# Patient Record
Sex: FEMALE | Race: OTHER | NOT HISPANIC OR LATINO | URBAN - METROPOLITAN AREA
[De-identification: names, ages, dates, MRNs, and addresses within clinical notes are randomized per-mention and may not be internally consistent; named-entity substitution may affect disease eponyms.]

---

## 2022-07-01 ENCOUNTER — EMERGENCY (EMERGENCY)
Facility: HOSPITAL | Age: 27
LOS: 1 days | Discharge: HOME | End: 2022-07-01
Attending: EMERGENCY MEDICINE | Admitting: EMERGENCY MEDICINE

## 2022-07-01 VITALS
TEMPERATURE: 98 F | RESPIRATION RATE: 18 BRPM | OXYGEN SATURATION: 99 % | HEART RATE: 97 BPM | SYSTOLIC BLOOD PRESSURE: 124 MMHG | DIASTOLIC BLOOD PRESSURE: 83 MMHG

## 2022-07-01 DIAGNOSIS — R41.82 ALTERED MENTAL STATUS, UNSPECIFIED: ICD-10-CM

## 2022-07-01 DIAGNOSIS — F12.122 CANNABIS ABUSE WITH INTOXICATION WITH PERCEPTUAL DISTURBANCE: ICD-10-CM

## 2022-07-01 DIAGNOSIS — Z20.822 CONTACT WITH AND (SUSPECTED) EXPOSURE TO COVID-19: ICD-10-CM

## 2022-07-01 PROCEDURE — 99053 MED SERV 10PM-8AM 24 HR FAC: CPT

## 2022-07-01 PROCEDURE — 99285 EMERGENCY DEPT VISIT HI MDM: CPT | Mod: 25

## 2022-07-01 NOTE — ED ADULT TRIAGE NOTE - CHIEF COMPLAINT QUOTE
Pt BIBA, EMS states pt was found wondering in store and appeared alerted. EMS states pt refused to say anything beyond "I don't want to go to the hospital". Marijuana joint found on pt.

## 2022-07-01 NOTE — ED ADULT TRIAGE NOTE - NSTRIAGECARE_GEN_A_ER
"Subjective     Anisha Ahn is a 62 y.o. female who presents with Hand Swelling ((R) hand, x8 days, pt states she scratched herself and now has a pulse in injury )        HPI     Patient presents today with a possible infected wound from a \"skin scratch\" 8 days ago. She reports that she was cleaning her house when she noted her gloves to be ripped and had a scratch on her right hand.  Three to four days prior, she noticed swelling and redness that was worse on her right hand, now also involving her forearm. She also endorses pain on her right armpit. Patient is not up to date on her tetanus vaccine.  Patient denies any fever, chills, nausea, vomiting.    Patient's current problem list, medications, and past medical/surgical history were reviewed in Epic.    PMH:  has no past medical history on file.  MEDS:   Current Outpatient Medications:   •  phenazopyridine (PYRIDIUM) 200 MG Tab, Take 1 Tab by mouth 3 times a day as needed for Mild Pain or Moderate Pain. (Patient not taking: Reported on 4/8/2022), Disp: 15 Tab, Rfl: 0  •  Pseudoephedrine-DM-GG (ROBITUSSIN COLD & COUGH PO), Take  by mouth. (Patient not taking: Reported on 4/8/2022), Disp: , Rfl:   ALLERGIES: No Known Allergies  SURGHX: No past surgical history on file.  SOCHX:  reports that she has never smoked. She has never used smokeless tobacco. She reports that she does not drink alcohol and does not use drugs.  FH: Reviewed with patient, not pertinent to this visit.       Review of Systems   Constitutional: Negative for chills, fever and malaise/fatigue.   HENT: Negative.    Eyes: Negative.    Respiratory: Negative.    Cardiovascular: Negative.    Gastrointestinal: Negative.    Genitourinary: Negative.    Musculoskeletal: Negative.    Skin:        Infected wound   Neurological: Negative.    Psychiatric/Behavioral: Negative.               Objective     /72   Pulse 88   Temp 36.6 °C (97.9 °F) (Temporal)   Resp 16   Ht 1.549 m (5' 1\")   Wt 51.3 " kg (113 lb)   SpO2 98%   BMI 21.35 kg/m²      Physical Exam  Constitutional:       Appearance: Normal appearance.   HENT:      Head: Normocephalic.      Nose: Nose normal.   Eyes:      Extraocular Movements: Extraocular movements intact.   Cardiovascular:      Rate and Rhythm: Normal rate and regular rhythm.      Pulses: Normal pulses.      Heart sounds: Normal heart sounds.   Pulmonary:      Effort: Pulmonary effort is normal.      Breath sounds: Normal breath sounds.   Musculoskeletal:         General: Normal range of motion.      Cervical back: Normal range of motion.   Skin:     General: Skin is warm and dry.      Findings: Wound present.      Comments: Puncture wound right hand, at the base of middle finger dorsal area.This is erythematous and swollen.  There is also erythema on the anterior portion of the right forearm.  Lymph node on the right armpit is also palpable, movable, tender to palpation.   Neurological:      General: No focal deficit present.      Mental Status: She is alert.   Psychiatric:         Mood and Affect: Mood normal.         Behavior: Behavior normal.         Judgment: Judgment normal.            Assessment & Plan        1. Infected wound    - TD Preservative Free =>6yo IM  - clindamycin (CLEOCIN) 300 MG Cap; Take 1 Capsule by mouth 3 times a day for 5 days.  Dispense: 15 Capsule; Refill: 0    2. Lymphadenopathy, axillary    - TD Preservative Free =>6yo IM  - clindamycin (CLEOCIN) 300 MG Cap; Take 1 Capsule by mouth 3 times a day for 5 days.  Dispense: 15 Capsule; Refill: 0    Patient's presentation is consistent with infected puncture treatment and right now finger.  There is also axillary lymphadenopathy on the right.  Discussed treatment plan with patient, she is agreeable and verbalized understanding.  She is given tetanus in the clinic.  She is prescribed clindamycin 3 times daily for 5 days.  Educated patient on signs and symptoms watch out for, when to return to clinic or go to  the ER.    Differential diagnoses, supportive care, and indications for immediate follow-up discussed with patient.   Pathogenesis of diagnosis discussed including typical length and natural progression.     Instructed to return to clinic or nearest emergency department for any change in condition, further concerns, or worsening of symptoms.    Electronically Signed by JEANETTE Gonzalez                 Face Mask

## 2022-07-02 VITALS
DIASTOLIC BLOOD PRESSURE: 80 MMHG | RESPIRATION RATE: 20 BRPM | OXYGEN SATURATION: 98 % | HEART RATE: 76 BPM | TEMPERATURE: 98 F | SYSTOLIC BLOOD PRESSURE: 113 MMHG

## 2022-07-02 DIAGNOSIS — F12.122 CANNABIS ABUSE WITH INTOXICATION WITH PERCEPTUAL DISTURBANCE: ICD-10-CM

## 2022-07-02 LAB
ALBUMIN SERPL ELPH-MCNC: 3.8 G/DL — SIGNIFICANT CHANGE UP (ref 3.4–5)
ALP SERPL-CCNC: 80 U/L — SIGNIFICANT CHANGE UP (ref 40–120)
ALT FLD-CCNC: 31 U/L — SIGNIFICANT CHANGE UP (ref 12–42)
AMPHET UR-MCNC: NEGATIVE — SIGNIFICANT CHANGE UP
ANION GAP SERPL CALC-SCNC: 7 MMOL/L — LOW (ref 9–16)
AST SERPL-CCNC: 17 U/L — SIGNIFICANT CHANGE UP (ref 15–37)
BARBITURATES UR SCN-MCNC: NEGATIVE — SIGNIFICANT CHANGE UP
BASOPHILS # BLD AUTO: 0.03 K/UL — SIGNIFICANT CHANGE UP (ref 0–0.2)
BASOPHILS NFR BLD AUTO: 0.3 % — SIGNIFICANT CHANGE UP (ref 0–2)
BENZODIAZ UR-MCNC: NEGATIVE — SIGNIFICANT CHANGE UP
BILIRUB SERPL-MCNC: 0.5 MG/DL — SIGNIFICANT CHANGE UP (ref 0.2–1.2)
BUN SERPL-MCNC: 10 MG/DL — SIGNIFICANT CHANGE UP (ref 7–23)
CALCIUM SERPL-MCNC: 9.2 MG/DL — SIGNIFICANT CHANGE UP (ref 8.5–10.5)
CHLORIDE SERPL-SCNC: 103 MMOL/L — SIGNIFICANT CHANGE UP (ref 96–108)
CO2 SERPL-SCNC: 26 MMOL/L — SIGNIFICANT CHANGE UP (ref 22–31)
COCAINE METAB.OTHER UR-MCNC: NEGATIVE — SIGNIFICANT CHANGE UP
CREAT SERPL-MCNC: 0.72 MG/DL — SIGNIFICANT CHANGE UP (ref 0.5–1.3)
EGFR: 120 ML/MIN/1.73M2 — SIGNIFICANT CHANGE UP
EOSINOPHIL # BLD AUTO: 0.01 K/UL — SIGNIFICANT CHANGE UP (ref 0–0.5)
EOSINOPHIL NFR BLD AUTO: 0.1 % — SIGNIFICANT CHANGE UP (ref 0–6)
ETHANOL SERPL-MCNC: <3 MG/DL — SIGNIFICANT CHANGE UP
GLUCOSE SERPL-MCNC: 121 MG/DL — HIGH (ref 70–99)
HCG SERPL-ACNC: 1 MIU/ML — SIGNIFICANT CHANGE UP
HCT VFR BLD CALC: 41.2 % — SIGNIFICANT CHANGE UP (ref 34.5–45)
HGB BLD-MCNC: 14 G/DL — SIGNIFICANT CHANGE UP (ref 11.5–15.5)
IMM GRANULOCYTES NFR BLD AUTO: 0.5 % — SIGNIFICANT CHANGE UP (ref 0–1.5)
LYMPHOCYTES # BLD AUTO: 1.66 K/UL — SIGNIFICANT CHANGE UP (ref 1–3.3)
LYMPHOCYTES # BLD AUTO: 15.1 % — SIGNIFICANT CHANGE UP (ref 13–44)
MCHC RBC-ENTMCNC: 30.2 PG — SIGNIFICANT CHANGE UP (ref 27–34)
MCHC RBC-ENTMCNC: 34 GM/DL — SIGNIFICANT CHANGE UP (ref 32–36)
MCV RBC AUTO: 88.8 FL — SIGNIFICANT CHANGE UP (ref 80–100)
METHADONE UR-MCNC: NEGATIVE — SIGNIFICANT CHANGE UP
MONOCYTES # BLD AUTO: 0.53 K/UL — SIGNIFICANT CHANGE UP (ref 0–0.9)
MONOCYTES NFR BLD AUTO: 4.8 % — SIGNIFICANT CHANGE UP (ref 2–14)
NEUTROPHILS # BLD AUTO: 8.72 K/UL — HIGH (ref 1.8–7.4)
NEUTROPHILS NFR BLD AUTO: 79.2 % — HIGH (ref 43–77)
NRBC # BLD: 0 /100 WBCS — SIGNIFICANT CHANGE UP (ref 0–0)
OPIATES UR-MCNC: NEGATIVE — SIGNIFICANT CHANGE UP
PCP SPEC-MCNC: SIGNIFICANT CHANGE UP
PCP UR-MCNC: NEGATIVE — SIGNIFICANT CHANGE UP
PLATELET # BLD AUTO: 234 K/UL — SIGNIFICANT CHANGE UP (ref 150–400)
POTASSIUM SERPL-MCNC: 4.1 MMOL/L — SIGNIFICANT CHANGE UP (ref 3.5–5.3)
POTASSIUM SERPL-SCNC: 4.1 MMOL/L — SIGNIFICANT CHANGE UP (ref 3.5–5.3)
PROT SERPL-MCNC: 7.7 G/DL — SIGNIFICANT CHANGE UP (ref 6.4–8.2)
RBC # BLD: 4.64 M/UL — SIGNIFICANT CHANGE UP (ref 3.8–5.2)
RBC # FLD: 13.1 % — SIGNIFICANT CHANGE UP (ref 10.3–14.5)
SARS-COV-2 RNA SPEC QL NAA+PROBE: SIGNIFICANT CHANGE UP
SODIUM SERPL-SCNC: 136 MMOL/L — SIGNIFICANT CHANGE UP (ref 132–145)
THC UR QL: POSITIVE
WBC # BLD: 11 K/UL — HIGH (ref 3.8–10.5)
WBC # FLD AUTO: 11 K/UL — HIGH (ref 3.8–10.5)

## 2022-07-02 PROCEDURE — 70450 CT HEAD/BRAIN W/O DYE: CPT | Mod: 26

## 2022-07-02 PROCEDURE — 99236 HOSP IP/OBS SAME DATE HI 85: CPT

## 2022-07-02 RX ADMIN — Medication 1 MILLIGRAM(S): at 01:00

## 2022-07-02 NOTE — ED ADULT NURSE REASSESSMENT NOTE - NS ED NURSE REASSESS COMMENT FT1
Transfer of care acknowledged with bedside rounding, lab results reviewed, will continue to monitor.  sleeping comfortably, fall precautions maintained
pt still altered, just shaking head yes or no but not answering questions  allowing to metabolize further  labs sent
Received pt alert and comfortable. No complaints at this time. pt on phone with telepsych. Spoke with telepsych . remains on constant observation. Safety and comfort maintained.
care assumed, pt on stretcher shaking her head "no." attempt made to assess orientation level with  and in english pt only responds by nodding head, remains nonverbal. no injuries noted, will continue to monitor  no s/s of distress

## 2022-07-02 NOTE — ED PROVIDER NOTE - OBJECTIVE STATEMENT
Patient is unable to provide an adequate history.  EMS states pt was found wandering around in a store and appeared altered. EMS states pt refused to say anything beyond "I don't want to go to the hospital". Marijuana joint found on pt.  She will not admit to using any other drugs/edibles today.  No ETOH use.  No report of any injuries, fall, or head injury.  She denies having a headache.  No neck stiffness  No visual changes.  No focal weakness.

## 2022-07-02 NOTE — ED PROVIDER NOTE - SHIFT CHANGE DETAILS
Patient will be observed in ED and re-assessed.  May consider Urine Tox screen and/or labs if no improvement.  Possibly even psych consult if concern for acute psychosis/psychotic break.

## 2022-07-02 NOTE — BEHAVIORAL HEALTH ASSESSMENT NOTE - SUMMARY
This is a 28 yo woman from [facility], on brief vacation in the [place] with no prior psychiatric hx, who presents with acute confusion and odd behavior since yesterday, found to have Utox positive for cannabis. Initially, the patient's presentation seemed concerning for possible underlying psychotic disorder given the prominence and confluence of positive symptoms including odd relatedness, disorganized speech/thinking (impoverishment), and report of prior ideas of reference and paranoid ideations that are now resolved. However, the patient's psychotic sx have been resolving much more quickly than expected while under observation in the ED, and on the most recent assessment the patient was almost fully reconstituted, with no detectable major psychiatric impairment. 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 The patient does say she noticed a week ago some difficult to define changes in her mood and self-awareness which precede the acute psychotic sx yesterday, and therefore ruling out psychotic disorder should be on the differential in the future if sx arise again.

## 2022-07-02 NOTE — ED CDU PROVIDER DISPOSITION NOTE - CLINICAL COURSE
pt now fully alert and oriented, admits to marijuana use last night at a concert in [place], now fully coherent, no paranoid symptoms, no hallucinations. back to baseline. will dc. psychiatry consultant in agreement.

## 2022-07-02 NOTE — ED PROVIDER NOTE - CLINICAL SUMMARY MEDICAL DECISION MAKING FREE TEXT BOX
Patient will be observed in the ED until clinically sober.    If mental status has improved and she is able to ambulate without difficulty, she should be able to be discharged.  Vital signs stable.  No further complaints.

## 2022-07-02 NOTE — ED BEHAVIORAL HEALTH NOTE - BEHAVIORAL HEALTH NOTE
Patient interviewed in Lao. 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 During her time in [place] she has felt sad, but no other reported sx of depression and no sx of darrick/anxiety/psychosis during this time. Yesterday she had an uncharacteristic experience of confusion described in chart, associated with +Utox for MJ but no recollection of ingestion.  Currently on exam the patient is well groomed, calm, cooperative, linear, no SI/HI/AH/VH. She is requesting to leave with her friend and her friend's , to rest today/tomorrow and take her return flight to [place] on July 4, where she will follow up with her PMD. She is not interested in psychiatric admission and states that if she has any thoughts of harming self/others or other concerns she will tell her friends (in [place]) and her aunts (in [place]).

## 2022-07-02 NOTE — ED CDU PROVIDER INITIAL DAY NOTE - CHIEF COMPLAINT
Patient called back and I relayed the message    RN attempt to call pt regarding Dr. Gallo's message below. No answer. Left non-detailed VM with call back number.    If pt calls back, please relay information below to pt.    Thanks,    MARY Muñoz, RN   [facility]      ----- Message from Kavya Gallo DO sent at 1/11/2022  6:48 AM CST -----    No signs of infection with yeast or bacterial vaginosis.  I will get back to you on the STDs.  For now I do think that the irritation that you are experiencing is from the latex condom and the skin itself is inflamed/irritated.  I have sent for you a steroid cream triamcinolone you can place that twice a day for 5 to 7 days until resolution.    Kavya Gallo DO   1/11/2022  3:15 PM CST         STD tests are now back and are negative.  Please see message below.         
The patient is a 24y Female complaining of altered mental status.

## 2022-07-02 NOTE — BEHAVIORAL HEALTH ASSESSMENT NOTE - SUICIDE PROTECTIVE FACTORS
Responsibility to family and others/Identifies reasons for living/Has future plans/Supportive social network of family or friends/Fear of death or the actual act of killing self/Cultural, spiritual and/or moral attitudes against suicide/Engaged in work or school/Judaism beliefs

## 2022-07-02 NOTE — ED ADULT NURSE NOTE - NSIMPLEMENTINTERV_GEN_ALL_ED
Implemented All Fall Risk Interventions:  [place] to call system. Call bell, personal items and telephone within reach. Instruct patient to call for assistance. Room bathroom lighting operational. Non-slip footwear when patient is off stretcher. Physically safe environment: no spills, clutter or unnecessary equipment. Stretcher in lowest position, wheels locked, appropriate side rails in place. Provide visual cue, wrist band, yellow gown, etc. Monitor gait and stability. Monitor for mental status changes and reorient to person, place, and time. Review medications for side effects contributing to fall risk. Reinforce activity limits and safety measures with patient and family.

## 2022-07-02 NOTE — ED PROVIDER NOTE - CONSTITUTIONAL, MLM
Well appearing, awake, alert, but disoriented;  Tearful at times, but in no apparent distress. normal...

## 2022-07-02 NOTE — ED CDU PROVIDER DISPOSITION NOTE - PATIENT PORTAL LINK FT
You can access the FollowMyHealth Patient Portal offered by [facility] by registering at the following website: [email or web address]. By joining [employer]’s FollowMyHealth portal, you will also be able to view your health information using other applications (apps) compatible with our system.

## 2022-07-02 NOTE — ED BEHAVIORAL HEALTH NOTE - BEHAVIORAL HEALTH NOTE
===================  PRE-HOSPITAL COURSE  ===================  SOURCE: ED RN and secondhand documentation   DETAILS: Patient was BIBEMS after being found wandering in a store w/ ?AMS.     ============  ED COURSE   ============  SOURCE: ED RN and secondhand documentation   ARRIVAL: Patient was BIBEMS.   BELONGINGS: All belongings were provided to hospital security, and patient currently in a gown with a 1:1 staff member.  BEHAVIOR: As per RN, patient in triage/when arrived was tearful, yelling and crying. Patient confused and disoriented. Ed staff considering psych vs substance use. Patient positive for marijuana and EtOH in ED. Overnight patient resting in bed, sleeping with some improvement of mental status but still with some confusion.   TREATMENT: Patient required ativan 1mg injectable.   VISITORS: None at bedside; patient arrived alone.     COMMENTS:     As per nurse, patient unable to remember the code to unlock her cell phone to access any collateral contacts. At this time, no collateral contacts could be obtained. ===================  PRE-HOSPITAL COURSE  ===================  SOURCE: ED RN and secondhand documentation   DETAILS: Patient was BIBEMS after being found wandering in a store w/ ?AMS.     ============  ED COURSE   ============  SOURCE: ED RN and secondhand documentation   ARRIVAL: Patient was BIBEMS.   BELONGINGS: All belongings were provided to hospital security, and patient currently in a gown with a 1:1 staff member.   BEHAVIOR: Patient found wandering in store, EMS activated by bystander. As per RN, patient in triage/when arrived was tearful, yelling and crying. Patient confused, disoriented and unable to voice concerns- only shaking her head yes and no. Ed staff considering psych vs substance use. Patient positive for marijuana and EtOH in ED. Overnight patient resting in bed, sleeping with some improvement of mental status but still with some confusion.   TREATMENT: Patient required ativan 1mg injectable.   VISITORS: None at bedside; patient arrived alone.     COMMENTS:     As per nurse, patient unable to remember the code to unlock her cell phone to access any collateral contacts. At this time, no collateral contacts could be obtained.

## 2022-07-02 NOTE — ED PROVIDER NOTE - PROGRESS NOTE DETAILS
Pt became very anxious/agitated and was crying inappropriately.  Not making any sense.  Given Ativan 1mg IM with significant improvement.   phone used to try to interrogate patient once again.  She kept stating "Is this reality or a dream?" and "I can change the world" among other strange comments.  Suspect patient is under the influence of an unknown substance.  Neuro exam is still nonfocal. luann - signed out to me by dr love pending sobriety from suspected intoxication. slightly improved overnight, answering yes and no questions, but appears unable to have a detailed conversation, she is awake/alert and according to other staff members who have spoke with her, conversational in English and [place].  improved, still not speaking, will check labs and consider psych eval in AM if still not improved. signed out to dr walker luann - signed out to me by dr love pending sobriety from suspected intoxication. slightly improved overnight, answering yes and no questions, but appears unable to have a detailed conversation, she is awake/alert and according to other staff members who have spoke with her, conversational in English and Ivorian.  improved, still not speaking, will check labs and consider psych eval in AM if still not improved. signed out to dr walker. admit to observation pending psych eval/sobriety.

## 2022-07-02 NOTE — BEHAVIORAL HEALTH ASSESSMENT NOTE - HPI (INCLUDE ILLNESS QUALITY, SEVERITY, DURATION, TIMING, CONTEXT, MODIFYING FACTORS, ASSOCIATED SIGNS AND SYMPTOMS)
The patient is a 28 yo woman from [facility], on [place]week vacation in the [place] domiciled with parents in home country, employed, with no PMH and no prior psychiatric hx, no prior SA/self-harm/violence hx, who was BIB EMS from a retail store due to appearing confused and generally ill.    On initial presentation to the ED, as per the ED provider report and chart, the patient was odd appearing and minimally conversant, often not answering questions directly and unable to provide a coherent history. Her Utox resulted positive for cannabis, even though the patient has been denying any drug use and does not have any suspicion of where the substance could have come from.    On my evaluation, the patient appeared slightly improved but was still oddly related, not comprehending fully, and sometimes providing illogical responses. Appeared somewhat thought-blocked. She reports feeling confused and not knowing how she ended up in the hospital. All she recalls is that she went to a [employer] concert yesterday and at some point began feeling sick and throwing up, and then was offered water by a stranger. She is not sure how/when EMS was called. She reports various odd psychotic experiences including suddenly believing she was receiving messages from God, preoccupation with world peace, becoming tearful but not knowing why, feeling somewhat dissociated, having difficulty speaking properly. She also describes having vague fear but again not recognizing the source of her fear. Describes some paranoid ideation toward staff on arrival to the hospital.    Now she reports all of the above symptoms have resolved. On subsequent assessment several hours, the patient is much more improved and is speaking more fluidly, able to provide cogent history including further details of events of yesterday and time course of her travel & activities, etc., and more detailed demographic information. She is very appropriately related and denying any psychiatric concerns including SI/HI, depression, anxiety, perceptual disturbances, IoR.    Collateral was obtained from her friend in [place] whom she stayed with while on vacation. He reports the patient seems herself currently. He denies noticing any acute changes in the past week, and also did not know how patient ended up in the hospital.

## 2022-07-02 NOTE — ED ADULT NURSE NOTE - OBJECTIVE STATEMENT
Pt presents to ed bibems for AMS. presents tearful, yelling and crying. using  to speak to pt, pt is confused and disoriented. Keeps making statements such as "is this real, am I dreaming" and "I feel like I can change the world" but unable to state her name or where she lives. no signs of trauma/injuries to body  unable to state what she took/or if she drank anything

## 2022-07-02 NOTE — ED ADULT NURSE REASSESSMENT NOTE - GENERAL PATIENT STATE
comfortable appearance
awakens with ease/comfortable appearance/cooperative/resting/sleeping
cooperative

## 2022-07-02 NOTE — BEHAVIORAL HEALTH ASSESSMENT NOTE - RISK ASSESSMENT
The patient currently has no acute psychiatric complaints or impairment. She is at her baseline and has no suicidal ideation or other acute distress. Therefore she is at low acute risk of dangerous behavior. However, given her presentation to the ED following fairly significant cannabis induced psychotic sx, the patient is at slightly elevated chronic risk going forward. Low Acute Suicide Risk

## 2023-10-06 NOTE — BEHAVIORAL HEALTH ASSESSMENT NOTE - NS ED BHA TELEPSYCH PATIENT LOCATION
[facility] Cephalexin Counseling: I counseled the patient regarding use of cephalexin as an antibiotic for prophylactic and/or therapeutic purposes. Cephalexin (commonly prescribed under brand name Keflex) is a cephalosporin antibiotic which is active against numerous classes of bacteria, including most skin bacteria. Side effects may include nausea, diarrhea, gastrointestinal upset, rash, hives, yeast infections, and in rare cases, hepatitis, kidney disease, seizures, fever, confusion, neurologic symptoms, and others. Patients with severe allergies to penicillin medications are cautioned that there is about a 10% incidence of cross-reactivity with cephalosporins. When possible, patients with penicillin allergies should use alternatives to cephalosporins for antibiotic therapy.

## 2024-10-30 NOTE — ED ADULT NURSE REASSESSMENT NOTE - PATIENT ON (OXYGEN DELIVERY METHOD)
[facility]  [address] [place] [address]    Today's Date: 10/30/2024    Insulin Dosing Instructions    YOUR INSULIN DOSING HAS BEEN CHANGED TO: Before Breakfast Before Lunch Before Evening Meal Bedtime   Long-Acting Insulins Basaglar (Glargine)  Lantus (Glargine)  Levemir (Detemir)  Semglee (Glargine)  Toujeo (Glargine)  Tresiba (Degludec) 50   50   Rapid-Acting Insulins Admelog (Lispro)  Apidra (Glulisine)  Flasp (Aspart)  Humalog (Lispro)  Lyumjev (Lispro)  Novolog (Aspart) 20 20 20    NPH Insulin Humulin/Novolin N       Regular Insulin Humulin/Novolin R       Premixed Insulin Humalog/Humulin  Novolog/Novolin  Mix 70/30, 75/25, or 50/50       Sliding Scale for Rapid-acting   or regular insulin based on blood sugar 151-200 + + + +    201-250 + + + +    251-300 + + + +    301-350 + + + +    >350 + + + +     Adjusting your long-acting insulin:      If pre-breakfast sugar is below 90 on 2 consecutive days, then decrease your long-acting insulin/ Lantus  by 2 units.    Continue Metformin and Actos    Stop taking Ozempic            room air